# Patient Record
Sex: MALE | Race: WHITE | HISPANIC OR LATINO | URBAN - METROPOLITAN AREA
[De-identification: names, ages, dates, MRNs, and addresses within clinical notes are randomized per-mention and may not be internally consistent; named-entity substitution may affect disease eponyms.]

---

## 2023-03-23 PROBLEM — E78.5 HYPERLIPIDEMIA: Status: ACTIVE | Noted: 2023-03-23

## 2023-03-23 PROBLEM — I10 HYPERTENSION: Status: ACTIVE | Noted: 2023-03-23

## 2023-03-24 DIAGNOSIS — E11.69 TYPE 2 DIABETES MELLITUS WITH OTHER SPECIFIED COMPLICATION, WITHOUT LONG-TERM CURRENT USE OF INSULIN (MULTI): Primary | ICD-10-CM

## 2023-03-27 RX ORDER — LOVASTATIN 20 MG/1
1 TABLET ORAL DAILY
COMMUNITY
Start: 2019-09-30 | End: 2023-09-06 | Stop reason: ALTCHOICE

## 2023-03-27 RX ORDER — METFORMIN HYDROCHLORIDE 1000 MG/1
1000 TABLET ORAL 2 TIMES DAILY
COMMUNITY
End: 2023-09-06 | Stop reason: ALTCHOICE

## 2023-03-27 RX ORDER — LISINOPRIL 20 MG/1
20 TABLET ORAL DAILY
COMMUNITY
End: 2023-09-06 | Stop reason: SDUPTHER

## 2023-03-27 RX ORDER — METFORMIN HYDROCHLORIDE 1000 MG/1
1000 TABLET ORAL 2 TIMES DAILY
Qty: 180 TABLET | Refills: 1 | Status: SHIPPED | OUTPATIENT
Start: 2023-03-27 | End: 2023-09-06 | Stop reason: SDUPTHER

## 2023-03-27 RX ORDER — GLIMEPIRIDE 4 MG/1
8 TABLET ORAL DAILY
COMMUNITY
End: 2023-09-06 | Stop reason: SDUPTHER

## 2023-08-10 LAB — HEMOGLOBIN A1C/HEMOGLOBIN TOTAL IN BLOOD EXTERNAL: 7.2 %

## 2023-09-06 ENCOUNTER — OFFICE VISIT (OUTPATIENT)
Dept: PRIMARY CARE | Facility: CLINIC | Age: 63
End: 2023-09-06

## 2023-09-06 VITALS
SYSTOLIC BLOOD PRESSURE: 132 MMHG | HEIGHT: 70 IN | TEMPERATURE: 97 F | HEART RATE: 72 BPM | WEIGHT: 168.2 LBS | DIASTOLIC BLOOD PRESSURE: 80 MMHG | RESPIRATION RATE: 18 BRPM | BODY MASS INDEX: 24.08 KG/M2 | OXYGEN SATURATION: 97 %

## 2023-09-06 DIAGNOSIS — I10 PRIMARY HYPERTENSION: Primary | ICD-10-CM

## 2023-09-06 DIAGNOSIS — E11.69 TYPE 2 DIABETES MELLITUS WITH OTHER SPECIFIED COMPLICATION, WITHOUT LONG-TERM CURRENT USE OF INSULIN (MULTI): ICD-10-CM

## 2023-09-06 DIAGNOSIS — E78.5 HYPERLIPIDEMIA, UNSPECIFIED HYPERLIPIDEMIA TYPE: ICD-10-CM

## 2023-09-06 PROBLEM — R19.5 OCCULT BLOOD POSITIVE STOOL: Status: ACTIVE | Noted: 2021-09-15

## 2023-09-06 PROBLEM — E55.9 VITAMIN D DEFICIENCY: Status: ACTIVE | Noted: 2023-09-06

## 2023-09-06 PROBLEM — E11.9 TYPE 2 DIABETES MELLITUS WITHOUT COMPLICATION, WITHOUT LONG-TERM CURRENT USE OF INSULIN (MULTI): Status: ACTIVE | Noted: 2021-09-05

## 2023-09-06 PROBLEM — J32.9 SINOBRONCHITIS: Status: ACTIVE | Noted: 2023-09-06

## 2023-09-06 PROBLEM — E44.0 MALNUTRITION OF MODERATE DEGREE (MULTI): Status: ACTIVE | Noted: 2021-09-08

## 2023-09-06 PROBLEM — J12.82 PNEUMONIA DUE TO COVID-19 VIRUS: Status: ACTIVE | Noted: 2021-09-06

## 2023-09-06 PROBLEM — U07.1 PNEUMONIA DUE TO COVID-19 VIRUS: Status: ACTIVE | Noted: 2021-09-06

## 2023-09-06 PROBLEM — K63.5 COLON POLYP: Status: ACTIVE | Noted: 2023-09-06

## 2023-09-06 PROBLEM — N52.9 ERECTILE DYSFUNCTION: Status: ACTIVE | Noted: 2023-09-06

## 2023-09-06 PROBLEM — M54.50 ACUTE MIDLINE LOW BACK PAIN WITHOUT SCIATICA: Status: ACTIVE | Noted: 2023-01-31

## 2023-09-06 PROBLEM — R06.09 DYSPNEA ON EXERTION: Status: ACTIVE | Noted: 2023-09-06

## 2023-09-06 PROBLEM — K27.9 PEPTIC ULCER: Status: ACTIVE | Noted: 2023-09-06

## 2023-09-06 PROBLEM — U07.1 DISEASE DUE TO SEVERE ACUTE RESPIRATORY SYNDROME CORONAVIRUS 2 (SARS-COV-2): Status: ACTIVE | Noted: 2023-09-06

## 2023-09-06 PROBLEM — K59.00 CONSTIPATION: Status: ACTIVE | Noted: 2021-09-16

## 2023-09-06 PROBLEM — M25.551 PAIN IN RIGHT HIP: Status: ACTIVE | Noted: 2023-01-31

## 2023-09-06 PROBLEM — K92.0 HEMATEMESIS OF UNKNOWN ETIOLOGY: Status: ACTIVE | Noted: 2021-09-14

## 2023-09-06 PROBLEM — K21.9 GASTROESOPHAGEAL REFLUX DISEASE WITHOUT ESOPHAGITIS: Status: ACTIVE | Noted: 2021-09-05

## 2023-09-06 PROBLEM — E11.9 DIABETES MELLITUS (MULTI): Status: ACTIVE | Noted: 2023-09-06

## 2023-09-06 PROBLEM — R35.1 NOCTURIA: Status: ACTIVE | Noted: 2023-09-06

## 2023-09-06 PROBLEM — J40 SINOBRONCHITIS: Status: ACTIVE | Noted: 2023-09-06

## 2023-09-06 PROCEDURE — 3079F DIAST BP 80-89 MM HG: CPT | Performed by: FAMILY MEDICINE

## 2023-09-06 PROCEDURE — 1036F TOBACCO NON-USER: CPT | Performed by: FAMILY MEDICINE

## 2023-09-06 PROCEDURE — 3075F SYST BP GE 130 - 139MM HG: CPT | Performed by: FAMILY MEDICINE

## 2023-09-06 PROCEDURE — 4010F ACE/ARB THERAPY RXD/TAKEN: CPT | Performed by: FAMILY MEDICINE

## 2023-09-06 PROCEDURE — 99212 OFFICE O/P EST SF 10 MIN: CPT | Performed by: FAMILY MEDICINE

## 2023-09-06 RX ORDER — GLIMEPIRIDE 4 MG/1
8 TABLET ORAL DAILY
Qty: 180 TABLET | Refills: 3 | Status: SHIPPED | OUTPATIENT
Start: 2023-09-06

## 2023-09-06 RX ORDER — METFORMIN HYDROCHLORIDE 1000 MG/1
1000 TABLET ORAL 2 TIMES DAILY
Qty: 180 TABLET | Refills: 3 | Status: SHIPPED | OUTPATIENT
Start: 2023-09-06

## 2023-09-06 RX ORDER — LISINOPRIL 20 MG/1
20 TABLET ORAL DAILY
Qty: 90 TABLET | Refills: 3 | Status: SHIPPED | OUTPATIENT
Start: 2023-09-06

## 2023-09-06 RX ORDER — LOVASTATIN 20 MG/1
20 TABLET ORAL DAILY
Qty: 90 TABLET | Refills: 3 | Status: SHIPPED | OUTPATIENT
Start: 2023-09-06

## 2023-09-06 NOTE — ASSESSMENT & PLAN NOTE
Result of cholesterol dated1/18/2023 cholesterol 185 HDL 51  Numbers excellent advised to continue meds in addition liver testing reviewed from same date with normal liver function studies

## 2023-09-06 NOTE — PATIENT INSTRUCTIONS

## 2023-09-06 NOTE — PROGRESS NOTES
"Subjective   Patient ID: Paulino Monroe is a 63 y.o. male who presents for yearly physical.    HPI   Patient here for refill of meds he has a diabetic monitoring and since earlier in the year was seen at the clinic with good control of his diabetes.  He is following diet and exercise routine.  Recently had hernia operation.  He has no complaints at this time needs refills on meds  Review of Systems  cardiovascular:  no  palpitations or chest  pain  respiratory: no  shortness  of  breath  endocrine:  no polydipsia,  no polyuria  musculoskeletal:  no  myalgia.. no arthralgia  All other  systems discussed  negative   Objective   /80   Pulse 72   Temp 36.1 °C (97 °F)   Resp 18   Ht 1.778 m (5' 10\")   Wt 76.3 kg (168 lb 3.2 oz)   SpO2 97%   BMI 24.13 kg/m²     Physical Exam  general: alert oriented x three  HEENT hearing normal to voice  Neck supple  Lungs respirations non-labored.  Cardiovascular: no peripheral edema  Skin: warm and dry without rash  Psych: judgement and insight normal  Musculoskeletal:  ambulation normal,    lymph:negative cervical  LYMPADENOPATHY  thyroid: non palpable enlargement   Assessment/Plan   Problem List Items Addressed This Visit       Hyperlipidemia     Result of cholesterol dated1/18/2023 cholesterol 185 HDL 51  Numbers excellent advised to continue meds in addition liver testing reviewed from same date with normal liver function studies         Relevant Medications    lovastatin (Mevacor) 20 mg tablet    Hypertension - Primary       blood pressure controlled today with review of laboratory testing specifically BMP dated 4/23 with normal GFR and normal electrolytes continue meds         Relevant Medications    lisinopril 20 mg tablet    Other Relevant Orders    Comprehensive Metabolic Panel    Diabetes mellitus (CMS/HCC)     Labs reviewed and hemoglobin A1c previously 6.1 advised to continue medications and diet and exercise see wrap-up         Relevant Medications    " metFORMIN (Glucophage) 1,000 mg tablet    glimepiride (Amaryl) 4 mg tablet    Other Relevant Orders    Lipid Panel    Albumin , Urine Random

## 2023-09-06 NOTE — ASSESSMENT & PLAN NOTE
blood pressure controlled today with review of laboratory testing specifically BMP dated 4/23 with normal GFR and normal electrolytes continue meds

## 2023-09-06 NOTE — ASSESSMENT & PLAN NOTE
Labs reviewed and hemoglobin A1c previously 6.1 advised to continue medications and diet and exercise see wrap-up

## 2024-09-11 ENCOUNTER — APPOINTMENT (OUTPATIENT)
Dept: PRIMARY CARE | Facility: CLINIC | Age: 64
End: 2024-09-11